# Patient Record
Sex: MALE | Race: WHITE | Employment: FULL TIME | URBAN - METROPOLITAN AREA
[De-identification: names, ages, dates, MRNs, and addresses within clinical notes are randomized per-mention and may not be internally consistent; named-entity substitution may affect disease eponyms.]

---

## 2023-09-21 ENCOUNTER — APPOINTMENT (OUTPATIENT)
Dept: GENERAL RADIOLOGY | Age: 43
End: 2023-09-21
Payer: COMMERCIAL

## 2023-09-21 ENCOUNTER — HOSPITAL ENCOUNTER (EMERGENCY)
Age: 43
Discharge: HOME | End: 2023-09-21
Attending: EMERGENCY MEDICINE
Payer: COMMERCIAL

## 2023-09-21 VITALS
HEART RATE: 77 BPM | HEIGHT: 71 IN | OXYGEN SATURATION: 98 % | SYSTOLIC BLOOD PRESSURE: 119 MMHG | RESPIRATION RATE: 19 BRPM | TEMPERATURE: 98.4 F | WEIGHT: 175 LBS | DIASTOLIC BLOOD PRESSURE: 90 MMHG | BODY MASS INDEX: 24.5 KG/M2

## 2023-09-21 DIAGNOSIS — S80.12XA HEMATOMA OF LEFT LOWER LEG: Primary | ICD-10-CM

## 2023-09-21 PROCEDURE — 73590 X-RAY EXAM OF LOWER LEG: CPT

## 2023-09-21 PROCEDURE — 99283 EMERGENCY DEPT VISIT LOW MDM: CPT

## 2023-09-21 ASSESSMENT — PAIN DESCRIPTION - ORIENTATION: ORIENTATION: LEFT

## 2023-09-21 ASSESSMENT — PAIN DESCRIPTION - LOCATION: LOCATION: LEG

## 2023-09-21 ASSESSMENT — PAIN DESCRIPTION - FREQUENCY: FREQUENCY: CONTINUOUS

## 2023-09-21 ASSESSMENT — PAIN - FUNCTIONAL ASSESSMENT: PAIN_FUNCTIONAL_ASSESSMENT: 0-10

## 2023-09-21 ASSESSMENT — PAIN DESCRIPTION - ONSET: ONSET: PROGRESSIVE

## 2023-09-21 ASSESSMENT — PAIN DESCRIPTION - PAIN TYPE: TYPE: ACUTE PAIN

## 2023-09-21 ASSESSMENT — PAIN DESCRIPTION - DESCRIPTORS: DESCRIPTORS: TIGHTNESS

## 2023-09-21 ASSESSMENT — PAIN SCALES - GENERAL: PAINLEVEL_OUTOF10: 2

## 2023-09-22 NOTE — ED PROVIDER NOTES
Emergency Department Provider Note       PCP: Juan Luis Escobedo MD   Age: 44 y.o. Sex: male     DISPOSITION Decision To Discharge 09/21/2023 09:53:00 PM       ICD-10-CM    1. Hematoma of left lower leg  S80. 12XA           Medical Decision Making     Complexity of Problems Addressed:  1 or more acute illnesses that pose a threat to life or bodily function. Data Reviewed and Analyzed:  I independently ordered and reviewed each unique test.  I reviewed external records: provider visit note from PCP. I interpreted the X-rays x-ray of the left tib-fib shows no fracture. Discussion of management or test interpretation. 26-year-old white male presents with swelling to his left leg. States he stumbled and fell and struck his leg on concrete. No head injury. No neck pain or back pain. Has been able to ambulate without difficulty. Presents now because of swelling to the lateral aspect of his left lower leg. No blood thinners    Physical exam  Well-nourished white male awake alert no distress. HEENT exam normocephalic atraumatic. Cardiovascular regular rate and rhythm. Lungs are clear. Extremity exam large amount of swelling to the lateral aspect of the left leg below the knee. No bony tenderness. Good distal pulses and sensation. Good range of motion to all joints. ED course  X-ray of tib-fib negative for fracture. I suspect this is hematoma. Patient advised to ice and elevate. Return if problems. Risk of Complications and/or Morbidity of Patient Management:  Shared medical decision making was utilized in creating the patients health plan today.          History       HPI     Review of Systems    Physical Exam     Vitals signs and nursing note reviewed:  Vitals:    09/21/23 2130 09/21/23 2145 09/21/23 2200 09/21/23 2215   BP: 119/82 120/82 116/84 (!) 119/90   Pulse:    77   Resp:    19   Temp:       TempSrc:       SpO2: 97% 97% 98% 98%   Weight:       Height:          Physical Exam

## 2023-09-22 NOTE — ED TRIAGE NOTES
Pt to the ED from home with spouse with c/o of left calf pain and edema after falling about 30 mins prior to arrival to the ED.